# Patient Record
(demographics unavailable — no encounter records)

---

## 2025-01-22 NOTE — PLAN
[FreeTextEntry1] : Patient presents to establish care.  Routine labs ordered (CBC, CMP, TSH, Lipids, U/a). would like a GYN Will do mammo next month when she turns 40  #joint pain on going for many years and with fatigue.  Likely just age related, but has hx of vitiligo.   Will do KALE, RF, CCP, and lupus testing Advised KALE is positive in many patients without rheumatological diseases Will see rheumatology if further investigation needed Advised proper diet and sleep  #palpitations getting a holter monitor with cardiology soon Will do CBC, CMP, and TSH to r/o acute issues

## 2025-01-22 NOTE — PHYSICAL EXAM
[No Acute Distress] : no acute distress [Well Nourished] : well nourished [Well Developed] : well developed [Well-Appearing] : well-appearing [Normal Sclera/Conjunctiva] : normal sclera/conjunctiva [Normal Outer Ear/Nose] : the outer ears and nose were normal in appearance [No JVD] : no jugular venous distention [No Respiratory Distress] : no respiratory distress  [No Accessory Muscle Use] : no accessory muscle use [Clear to Auscultation] : lungs were clear to auscultation bilaterally [Normal Rate] : normal rate  [Regular Rhythm] : with a regular rhythm [Normal S1, S2] : normal S1 and S2 [No Murmur] : no murmur heard [No Edema] : there was no peripheral edema [Normal Gait] : normal gait [Normal Affect] : the affect was normal [Normal Insight/Judgement] : insight and judgment were intact [de-identified] : no tenderness or swelling in fingers/wrists/elbows

## 2025-01-22 NOTE — HISTORY OF PRESENT ILLNESS
[FreeTextEntry1] : Patient presents as new patient to establish care. [de-identified] : Patient presents to establish care. 38 yo F w/ PMhx of vitiligo. had hx of vitiligo and cured with some treatment in  [FreeTextEntry8] : Patient presents to establish care. 40 yo F w/ PMhx of vitiligo, palpitations had hx of vitiligo and mostly resolved with some treatment in [place]. 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  Would like to be checked for rheumatological issues. Has heart palpitations which are currently being investigated by cardiology.  EKG was normal per patient.  Getting holter monitor soon.   No recent lab work. Has been having some fatigue on and off. Had dietary restrictions due to hx of vitiligo.

## 2025-01-22 NOTE — HISTORY OF PRESENT ILLNESS
[FreeTextEntry1] : Patient presents as new patient to establish care. [de-identified] : Patient presents to establish care. 40 yo F w/ PMhx of vitiligo. had hx of vitiligo and cured with some treatment in  [FreeTextEntry8] : Patient presents to establish care. 40 yo F w/ PMhx of vitiligo, palpitations had hx of vitiligo and mostly resolved with some treatment in [place]. 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  Would like to be checked for rheumatological issues. Has heart palpitations which are currently being investigated by cardiology.  EKG was normal per patient.  Getting holter monitor soon.   No recent lab work. Has been having some fatigue on and off. Had dietary restrictions due to hx of vitiligo.

## 2025-01-22 NOTE — HEALTH RISK ASSESSMENT
[No] : No [Never] : Never [No falls in past year] : Patient reported no falls in the past year [0] : 2) Feeling down, depressed, or hopeless: Not at all (0) [de-identified] : cardiology [[id number]] : 0

## 2025-01-22 NOTE — PHYSICAL EXAM
[No Acute Distress] : no acute distress [Well Nourished] : well nourished [Well Developed] : well developed [Well-Appearing] : well-appearing [Normal Sclera/Conjunctiva] : normal sclera/conjunctiva [Normal Outer Ear/Nose] : the outer ears and nose were normal in appearance [No JVD] : no jugular venous distention [No Respiratory Distress] : no respiratory distress  [No Accessory Muscle Use] : no accessory muscle use [Clear to Auscultation] : lungs were clear to auscultation bilaterally [Normal Rate] : normal rate  [Regular Rhythm] : with a regular rhythm [Normal S1, S2] : normal S1 and S2 [No Murmur] : no murmur heard [No Edema] : there was no peripheral edema [Normal Gait] : normal gait [Normal Affect] : the affect was normal [Normal Insight/Judgement] : insight and judgment were intact [de-identified] : no tenderness or swelling in fingers/wrists/elbows

## 2025-01-22 NOTE — HEALTH RISK ASSESSMENT
[No] : No [Never] : Never [No falls in past year] : Patient reported no falls in the past year [0] : 2) Feeling down, depressed, or hopeless: Not at all (0) [de-identified] : cardiology [[id number]] : 0